# Patient Record
Sex: FEMALE | Race: WHITE | URBAN - METROPOLITAN AREA
[De-identification: names, ages, dates, MRNs, and addresses within clinical notes are randomized per-mention and may not be internally consistent; named-entity substitution may affect disease eponyms.]

---

## 2022-02-21 ENCOUNTER — OFFICE VISIT (OUTPATIENT)
Dept: FAMILY MEDICINE CLINIC | Age: 32
End: 2022-02-21
Payer: COMMERCIAL

## 2022-02-21 VITALS
WEIGHT: 132 LBS | TEMPERATURE: 97.8 F | HEART RATE: 86 BPM | DIASTOLIC BLOOD PRESSURE: 86 MMHG | OXYGEN SATURATION: 98 % | SYSTOLIC BLOOD PRESSURE: 134 MMHG | BODY MASS INDEX: 20.72 KG/M2 | HEIGHT: 67 IN

## 2022-02-21 DIAGNOSIS — Z82.49 FAMILY HISTORY OF EARLY CAD: ICD-10-CM

## 2022-02-21 DIAGNOSIS — R01.1 HEART MURMUR: ICD-10-CM

## 2022-02-21 DIAGNOSIS — Z76.89 ENCOUNTER TO ESTABLISH CARE: Primary | ICD-10-CM

## 2022-02-21 DIAGNOSIS — Z01.419 ENCOUNTER FOR GYNECOLOGICAL EXAMINATION: ICD-10-CM
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
## 2022-02-21 DIAGNOSIS — Z76.89 ENCOUNTER TO ESTABLISH CARE: ICD-10-CM

## 2022-02-21 LAB
A/G RATIO: 1.9 (ref 1.1–2.2)
ALBUMIN SERPL-MCNC: 4.6 G/DL (ref 3.4–5)
ALP BLD-CCNC: 40 U/L (ref 40–129)
ALT SERPL-CCNC: 29 U/L (ref 10–40)
ANION GAP SERPL CALCULATED.3IONS-SCNC: 14 MMOL/L (ref 3–16)
AST SERPL-CCNC: 19 U/L (ref 15–37)
BILIRUB SERPL-MCNC: <0.2 MG/DL (ref 0–1)
BUN BLDV-MCNC: 15 MG/DL (ref 7–20)
CALCIUM SERPL-MCNC: 9.2 MG/DL (ref 8.3–10.6)
CHLORIDE BLD-SCNC: 101 MMOL/L (ref 99–110)
CHOLESTEROL, TOTAL: 199 MG/DL (ref 0–199)
CO2: 24 MMOL/L (ref 21–32)
CREAT SERPL-MCNC: 0.7 MG/DL (ref 0.6–1.1)
GFR AFRICAN AMERICAN: >60
GFR NON-AFRICAN AMERICAN: >60
GLUCOSE BLD-MCNC: 88 MG/DL (ref 70–99)
HCT VFR BLD CALC: 40.1 % (ref 36–48)
HDLC SERPL-MCNC: 63 MG/DL (ref 40–60)
HEMOGLOBIN: 13.4 G/DL (ref 12–16)
LDL CHOLESTEROL CALCULATED: 129 MG/DL
MCH RBC QN AUTO: 28.5 PG (ref 26–34)
MCHC RBC AUTO-ENTMCNC: 33.4 G/DL (ref 31–36)
MCV RBC AUTO: 85.2 FL (ref 80–100)
PDW BLD-RTO: 14.1 % (ref 12.4–15.4)
PLATELET # BLD: 219 K/UL (ref 135–450)
PMV BLD AUTO: 9.1 FL (ref 5–10.5)
POTASSIUM SERPL-SCNC: 4.2 MMOL/L (ref 3.5–5.1)
RBC # BLD: 4.71 M/UL (ref 4–5.2)
SODIUM BLD-SCNC: 139 MMOL/L (ref 136–145)
TOTAL PROTEIN: 7 G/DL (ref 6.4–8.2)
TRIGL SERPL-MCNC: 35 MG/DL (ref 0–150)
TSH REFLEX: 0.98 UIU/ML (ref 0.27–4.2)
VLDLC SERPL CALC-MCNC: 7 MG/DL
WBC # BLD: 4 K/UL (ref 4–11)

## 2022-02-21 PROCEDURE — 99203 OFFICE O/P NEW LOW 30 MIN: CPT | Performed by: PHYSICIAN ASSISTANT

## 2022-02-21 RX ORDER — ALBUTEROL SULFATE 2.5 MG/3ML
3 SOLUTION RESPIRATORY (INHALATION)
COMMUNITY

## 2022-02-21 SDOH — ECONOMIC STABILITY: TRANSPORTATION INSECURITY
IN THE PAST 12 MONTHS, HAS THE LACK OF TRANSPORTATION KEPT YOU FROM MEDICAL APPOINTMENTS OR FROM GETTING MEDICATIONS?: NO

## 2022-02-21 SDOH — ECONOMIC STABILITY: TRANSPORTATION INSECURITY
IN THE PAST 12 MONTHS, HAS LACK OF TRANSPORTATION KEPT YOU FROM MEETINGS, WORK, OR FROM GETTING THINGS NEEDED FOR DAILY LIVING?: NO

## 2022-02-21 SDOH — ECONOMIC STABILITY: FOOD INSECURITY: WITHIN THE PAST 12 MONTHS, THE FOOD YOU BOUGHT JUST DIDN'T LAST AND YOU DIDN'T HAVE MONEY TO GET MORE.: NEVER TRUE

## 2022-02-21 SDOH — ECONOMIC STABILITY: FOOD INSECURITY: WITHIN THE PAST 12 MONTHS, YOU WORRIED THAT YOUR FOOD WOULD RUN OUT BEFORE YOU GOT MONEY TO BUY MORE.: NEVER TRUE

## 2022-02-21 SDOH — ECONOMIC STABILITY: HOUSING INSECURITY: IN THE LAST 12 MONTHS, HOW MANY PLACES HAVE YOU LIVED?: 1

## 2022-02-21 SDOH — ECONOMIC STABILITY: INCOME INSECURITY: IN THE LAST 12 MONTHS, WAS THERE A TIME WHEN YOU WERE NOT ABLE TO PAY THE MORTGAGE OR RENT ON TIME?: NO

## 2022-02-21 SDOH — ECONOMIC STABILITY: HOUSING INSECURITY
IN THE LAST 12 MONTHS, WAS THERE A TIME WHEN YOU DID NOT HAVE A STEADY PLACE TO SLEEP OR SLEPT IN A SHELTER (INCLUDING NOW)?: NO

## 2022-02-21 ASSESSMENT — ENCOUNTER SYMPTOMS
SORE THROAT: 0
SHORTNESS OF BREATH: 0
NAUSEA: 0
DIARRHEA: 0
RHINORRHEA: 0
COUGH: 0
VOMITING: 0
ABDOMINAL PAIN: 0
CONSTIPATION: 0

## 2022-02-21 ASSESSMENT — PATIENT HEALTH QUESTIONNAIRE - PHQ9
1. LITTLE INTEREST OR PLEASURE IN DOING THINGS: 0
SUM OF ALL RESPONSES TO PHQ QUESTIONS 1-9: 0
SUM OF ALL RESPONSES TO PHQ9 QUESTIONS 1 & 2: 0
2. FEELING DOWN, DEPRESSED OR HOPELESS: 0

## 2022-02-21 ASSESSMENT — SOCIAL DETERMINANTS OF HEALTH (SDOH): HOW HARD IS IT FOR YOU TO PAY FOR THE VERY BASICS LIKE FOOD, HOUSING, MEDICAL CARE, AND HEATING?: NOT HARD AT ALL

## 2022-02-21 NOTE — PROGRESS NOTES
2022  Zeynep Anderson (: 1990)  28 y.o. HPI     Patient presents to establish care    No chronic medical history. States that she was sent for echo about 10 years ago after heart murmur heart on exam. Per patient benign findings with no follow up recommended. She declines chest pain, palpitations, shortness of breath, rodríguez, dizziness/light headedness. Also with history of asthma- on albuterol inhaler prn. No recent flares. Exercises daily with walking   No particular diet- does watch portion sizes. Mother with HTN and both grandparents with CAD, grandfather early onset. Patient previously on OCP x 16 years for painful, heavy periods. Stopped recently as she is considering starting a family. Normal monthly menses. She considers them \"heavy but normal.\" No previous pregnancies. Due for pap. Denies h/o abnormal pap. UTD on dental and eye exams. Works in [employer]- enjoys her job. Review of Systems   Constitutional: Negative for activity change, chills and fever. HENT: Negative for congestion, ear pain, rhinorrhea and sore throat. Eyes: Negative for visual disturbance. Respiratory: Negative for cough and shortness of breath. Cardiovascular: Negative for chest pain and palpitations. Gastrointestinal: Negative for abdominal pain, constipation, diarrhea, nausea and vomiting. Genitourinary: Negative for difficulty urinating and dysuria. Musculoskeletal: Negative for arthralgias and myalgias. Skin: Negative for rash. Neurological: Negative for dizziness, weakness and numbness. Psychiatric/Behavioral: Negative for sleep disturbance. Past Medical History:   Diagnosis Date    Asthma     Heart murmur      History reviewed. No pertinent surgical history.   Family History   Problem Relation Age of Onset    High Blood Pressure Mother     Breast Cancer Maternal Grandmother     High Blood Pressure Maternal Grandmother     Parkinson's Disease Paternal Grandfather      Social History     Socioeconomic History    Marital status: Unknown     Spouse name: Not on file    Number of children: Not on file    Years of education: Not on file    Highest education level: Not on file   Occupational History    Not on file   Tobacco Use    Smoking status: Never Smoker    Smokeless tobacco: Never Used   Vaping Use    Vaping Use: Never used   Substance and Sexual Activity    Alcohol use: Yes     Comment: occasionally    Drug use: Never    Sexual activity: Yes     Partners: Male   Other Topics Concern    Not on file   Social History Narrative    Not on file     Social Determinants of Health     Financial Resource Strain: Low Risk     Difficulty of Paying Living Expenses: Not hard at all   Food Insecurity: No Food Insecurity    Worried About Running Out of Food in the Last Year: Never true    Rita of Food in the Last Year: Never true   Transportation Needs: No Transportation Needs    Lack of Transportation (Medical): No    Lack of Transportation (Non-Medical):  No   Physical Activity:     Days of Exercise per Week: Not on file    Minutes of Exercise per Session: Not on file   Stress:     Feeling of Stress : Not on file   Social Connections:     Frequency of Communication with Friends and Family: Not on file    Frequency of Social Gatherings with Friends and Family: Not on file    Attends Alevism Services: Not on file    Active Member of Clubs or Organizations: Not on file    Attends Club or Organization Meetings: Not on file    Marital Status: Not on file   Intimate Partner Violence:     Fear of Current or Ex-Partner: Not on file    Emotionally Abused: Not on file    Physically Abused: Not on file    Sexually Abused: Not on file   Housing Stability: [address] Unable to Pay for Housing in the Last Year: No    Number of [place] in the Last Year: 1    Unstable Housing in the Last Year: No     Allergies   Allergen Reactions  Amoxicillin Rash       Current Outpatient Medications   Medication Sig Dispense Refill    albuterol (PROVENTIL) (2.5 MG/3ML) 0.083% nebulizer solution 3 mLs       No current facility-administered medications for this visit. Vitals:    02/21/22 0941   BP: 134/86   Site: Right Upper Arm   Position: Sitting   Cuff Size: Medium Adult   Pulse: 86   Temp: 97.8 °F (36.6 °C)   TempSrc: Oral   SpO2: 98%   Weight: 132 lb (59.9 kg)   Height: 5' 6.5\" (1.689 m)     Estimated body mass index is 20.99 kg/m² as calculated from the following:    Height as of this encounter: 5' 6.5\" (1.689 m). Weight as of this encounter: 132 lb (59.9 kg). Physical Exam  Constitutional:       General: She is not in acute distress. Appearance: She is well-developed. HENT:      Head: Normocephalic and atraumatic. Eyes:      Conjunctiva/sclera: Conjunctivae normal.      Pupils: Pupils are equal, round, and reactive to light. Cardiovascular:      Rate and Rhythm: Normal rate and regular rhythm. Heart sounds: No murmur heard. Gallop present. Pulmonary:      Effort: Pulmonary effort is normal.      Breath sounds: Normal breath sounds. No wheezing. Abdominal:      General: Bowel sounds are normal.      Palpations: Abdomen is soft. Tenderness: There is no abdominal tenderness. Musculoskeletal:      Cervical back: Neck supple. Lymphadenopathy:      Cervical: No cervical adenopathy. Skin:     General: Skin is warm and dry. Findings: No rash. Neurological:      Mental Status: She is alert and oriented to person, place, and time. ASSESSMENT and PLAN:  Renny Waldrop was seen today for new patient. Diagnoses and all orders for this visit:    Encounter to establish care  -     CBC; Future  -     Comprehensive Metabolic Panel; Future  -     LIPID PANEL; Future  -     TSH with Reflex;  Future  - update labs today   - patient to see gyn for pap     Encounter for gynecological examination  -     [facility]slan Lopes, DO, Gynecology, [place]     Family history of early CAD  -     Comprehensive Metabolic Panel; Future  -     LIPID PANEL; Future    Heart murmur  -     CBC; Future  -     Comprehensive Metabolic Panel; Future  - would consider echo with new onset sxs.      Exposure to tanning bed, initial encounter  -     External Referral To Dermatology      Return in about 1 year (around 2/21/2023) for Annual Exam.

## 2022-03-30 ENCOUNTER — OFFICE VISIT (OUTPATIENT)
Dept: OBGYN CLINIC | Age: 32
End: 2022-03-30
Payer: COMMERCIAL

## 2022-03-30 VITALS
SYSTOLIC BLOOD PRESSURE: 110 MMHG | DIASTOLIC BLOOD PRESSURE: 72 MMHG | BODY MASS INDEX: 20.92 KG/M2 | WEIGHT: 131.6 LBS | HEART RATE: 87 BPM | TEMPERATURE: 98.6 F

## 2022-03-30 DIAGNOSIS — Z01.419 WOMEN'S ANNUAL ROUTINE GYNECOLOGICAL EXAMINATION: Primary | ICD-10-CM

## 2022-03-30 DIAGNOSIS — Z30.09 FAMILY PLANNING: ICD-10-CM

## 2022-03-30 DIAGNOSIS — N92.6 IRREGULAR PERIODS: ICD-10-CM

## 2022-03-30 DIAGNOSIS — Z12.39 SCREENING BREAST EXAMINATION: ICD-10-CM

## 2022-03-30 DIAGNOSIS — Z12.4 PAP SMEAR FOR CERVICAL CANCER SCREENING: ICD-10-CM

## 2022-03-30 PROCEDURE — 99385 PREV VISIT NEW AGE 18-39: CPT | Performed by: OBSTETRICS & GYNECOLOGY

## 2022-03-30 NOTE — PROGRESS NOTES
[facility] Ob/[facility]yn   Annual Exam        CC:   Chief Complaint   Patient presents with    New Patient    Gynecologic Exam       HPI:  28 y.o. Melida Randall presents for her gynecologic annual exam.  Pt new to office. Doing well today. Admits to some irregular periods, every 1-3 months. Recently stopped using the birth control (ring). States her periods have always been irregular and heavy when she is not using medication. Is interested in pregnancy in the future. Reviewed fertility basics. Pt states her  is adopted and they are interested in pre-conception inheritest. Will do this for her today. Health Maintenance:  Safe Enviroment: y  Sexually Active: y   n sexual problems  Contraception: n    Screening:  Last pap smear: 5 yrs -- normal   History of abnormal pap smears: denies  STI History: GC or CT 7 yrs ago -- treated without other issues       Review of Systems:   Pertinent positives reviewed above. Constitutional: Negative for appetite change, chills and fever. HENT: Negative for congestion, sneezing and sore throat. Eyes: Negative for visual disturbance. Respiratory: Negative for chest tightness, shortness of breath and wheezing. Cardiovascular: Negative for chest pain, palpitations and leg swelling. Gastrointestinal: Negative for abdominal pain, diarrhea, nausea and vomiting. Endocrine: Negative for heat intolerance. Genitourinary: Negative for difficulty urinating, dyspareunia, dysuria, menstrual problem and pelvic pain.    + Irregular Menses   Musculoskeletal: Negative for back pain, neck pain and neck stiffness. Skin: Negative for color change, pallor and rash. Allergic/Immunologic: Negative for environmental allergies, food allergies and immunocompromised state. Neurological: Negative for light-headedness, numbness and headaches. Hematological: Does not bruise/bleed easily. Psychiatric/Behavioral: Negative for behavioral problems, sleep disturbance and suicidal ideas. Primary Care Physician: SIM Patel    Obstetric History  OB History    Para Term  AB Living   0 0 0 0 0 0   SAB IAB Ectopic Molar Multiple Live Births   0 0 0 0 0 0       Gynecologic History  Menstrual History:   Menarche: 16 yoa   LMP: Patient's last menstrual period was 2022.  Menstrual Period: irregular   Interval Between Menses: 1-3 mos   Duration of Menses: 3-5d   Menstrual Flow:    Bleeding between menses: heavy   o Uses PADS every 1-2 hrs    Pain: Some cramping -- does not miss work / ADL due to pain      MedicalHistory:  Past Medical History:   Diagnosis Date    Asthma     Heart murmur        Medications:  Current Outpatient Medications   Medication Sig Dispense Refill    albuterol (PROVENTIL) (2.5 MG/3ML) 0.083% nebulizer solution 3 mLs       No current facility-administered medications for this visit. Surgical History:  No past surgical history on file. Allergies:   Allergies   Allergen Reactions    Amoxicillin Rash       Family History:  Family History   Problem Relation Age of Onset    High Blood Pressure Mother     Breast Cancer Maternal Grandmother     High Blood Pressure Maternal Grandmother     Parkinson's Disease Paternal Grandfather        Social History:  Social History     Socioeconomic History    Marital status:      Spouse name: None    Number of children: None    Years of education: None    Highest education level: None   Occupational History    None   Tobacco Use    Smoking status: Never Smoker    Smokeless tobacco: Never Used   Vaping Use    Vaping Use: Never used   Substance and Sexual Activity    Alcohol use: Yes     Comment: occasionally    Drug use: Never    Sexual activity: Yes     Partners: Male   Other Topics Concern    None   Social History Narrative    None     Social Determinants of Health     Financial Resource Strain: Low Risk     Difficulty of Paying Living Expenses: Not hard at all   Food Insecurity: No Food Insecurity    Worried About Running Out of Food in the Last Year: Never true    Ran Out of Food in the Last Year: Never true   Transportation Needs: No Transportation Needs    Lack of Transportation (Medical): No    Lack of Transportation (Non-Medical): No   Physical Activity:     Days of Exercise per Week: Not on file    Minutes of Exercise per Session: Not on file   Stress:     Feeling of Stress : Not on file   Social Connections:     Frequency of Communication with Friends and Family: Not on file    Frequency of Social Gatherings with Friends and Family: Not on file    Attends Adventism Services: Not on file    Active Member of Clubs or Organizations: Not on file    Attends Club or Organization Meetings: Not on file    Marital Status: Not on file   Intimate Partner Violence:     Fear of Current or Ex-Partner: Not on file    Emotionally Abused: Not on file    Physically Abused: Not on file    Sexually Abused: Not on file   Housing Stability: [address] Unable to Pay for Housing in the Last Year: No    Number of [place] in the Last Year: 1    Unstable Housing in the Last Year: No       Objective: Body mass index is 20.92 kg/m².   /72 (Site: Left Upper Arm, Position: Sitting, Cuff Size: Medium Adult)   Pulse 87   Temp 98.6 °F (37 °C) (Infrared)   Wt 131 lb 9.6 oz (59.7 kg)   LMP 02/14/2022   BMI 20.92 kg/m²     Exam:   General: Alert, well appearing, no acute distress  Head: Normocephalic, atraumatic  Mouth: Mucous membranes moist, pharynx normal without lesions  Thyroid: No thyromegaly or masses present   Breasts: Symmetric, non-tender to palpation, no skin changes, palpable axillary lymph nodes or masses noted  Lungs: Respiratory effort within normal limits   CV: Regular rate   Abdomen: Soft, nontender, nondistended   Pelvic:   External: Normal appearing genitalia without masses, tenderness or lesions  Vagina: moist, pink mucosa, without abnormal discharge or lesions  Cervix: no masses or lesions visualized, no cervical motion tenderness  Uterus: mobile, midline, no masses palpated  Adnexa: mobile, non-tender to palpation, without masses  Rectovaginal: deferred  Extremities: No redness or tenderness, neg Tessa's sign  Skin: Well perfused, normal coloration and turgor, no lesions or rashes visualized  Neuro: Alert, oriented, normal speech, no focal deficits, moves extremities appropriately  Osteopathic: No TART changes    Assessment/Plan:  28 y.o. [place] presenting for her annual exam:     Diagnosis Orders   1. Women's annual routine gynecological examination     2. Pap smear for cervical cancer screening  PAP SMEAR   3. Screening breast examination     4. Irregular periods     5. Family planning        - normal breast / pelvic exam today   - PAP pending   - family planning reviewed   - preconception inheritest done today     Annual Visit Recommendations:   Self-breast exam and self-breast awareness reviewed. Pelvic exam was done and ASCCP guidelines reviewed   Reviewed healthy diet and daily multivitamin use. Reviewed recommendations on Calcium and Vitamin D intake. Discussed seatbelt use. Follow Up  - Will call patient with results   -Return in about 1 year (around 3/30/2023) for Annual or sooner if needed.     Angie Brand, DO

## 2022-04-01 LAB
HPV COMMENT: NORMAL
HPV TYPE 16: NOT DETECTED
HPV TYPE 18: NOT DETECTED
HPVOH (OTHER TYPES): NOT DETECTED

## 2022-04-25 ENCOUNTER — TELEPHONE (OUTPATIENT)
Dept: FAMILY MEDICINE CLINIC | Age: 32
End: 2022-04-25

## 2022-04-25 NOTE — TELEPHONE ENCOUNTER
----- Message from [facility] sent at 4/25/2022 11:33 AM EDT -----  Subject: Message to Provider    QUESTIONS  Information for Provider? Patient calling to notify Dr. To Arias that she   tested positive for Covid 04/24/22 and wanted to know if there is anything   she should be aware of since she has asthma. Please contact to discuss. ---------------------------------------------------------------------------  --------------  Dann CARLOS  What is the best way for the office to contact you? OK to leave message on   voicemail  Preferred Call Back Phone Number? [phone]  ---------------------------------------------------------------------------  --------------  SCRIPT ANSWERS  Relationship to Patient?  Self

## 2022-04-26 NOTE — TELEPHONE ENCOUNTER
I spoke with Guzman Gonzalez regarding this. Instructed her to stay well hydrated and well rested. She does not have a pulse oximeter so she is unable to monitor this. I also informed her that if she has any increased difficulty breathing or feels that she is in respiratory distress she should go to the ER. She verbalized good understanding.

## 2022-05-18 ENCOUNTER — TELEPHONE (OUTPATIENT)
Dept: OBGYN CLINIC | Age: 32
End: 2022-05-18

## 2022-05-18 DIAGNOSIS — N92.6 IRREGULAR PERIODS: Primary | ICD-10-CM

## 2022-05-18 DIAGNOSIS — N91.1 SECONDARY AMENORRHEA: ICD-10-CM

## 2022-05-18 DIAGNOSIS — R89.8 ABNORMAL GENETIC TEST: ICD-10-CM

## 2022-05-18 RX ORDER — MEDROXYPROGESTERONE ACETATE 10 MG/1
10 TABLET ORAL DAILY
Qty: 10 TABLET | Refills: 3 | Status: SHIPPED | OUTPATIENT
Start: 2022-05-18 | End: 2022-05-28

## 2022-05-18 NOTE — TELEPHONE ENCOUNTER
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 Test recommends genetic counseling -- we will refer to  for said counseling. In addition we discussed her continued amenorrhea, will try provera withdrawal bleed followed by OPKs.  Next step would be Provera + ovulation induction vs AGNIESZKA referral.     Misty Hutchinson

## 2022-05-19 ENCOUNTER — TELEPHONE (OUTPATIENT)
Dept: OBGYN CLINIC | Age: 32
End: 2022-05-19

## 2022-05-19 DIAGNOSIS — R89.8 ABNORMAL GENETIC TEST: Primary | ICD-10-CM

## 2022-05-19 NOTE — TELEPHONE ENCOUNTER
Please sign referral. Will fax to [phone]  Appointment scheduled for Thursday May 26 th at 0930 am   [phone]  Will call pt with info after referral sent.

## 2022-05-19 NOTE — TELEPHONE ENCOUNTER
Referral faxed. Attempted to call pt to give appointment information. LM for pt to call the office. She can call [phone] if appt needs to be rescheduled.

## 2022-05-25 ENCOUNTER — TELEPHONE (OUTPATIENT)
Dept: OBGYN CLINIC | Age: 32
End: 2022-05-25

## 2022-05-25 NOTE — TELEPHONE ENCOUNTER
Received call from . Requested Inheritest be faxed to [phone] for referral sent for Genetic counseling. Fax sent.  Routing as [employer]

## 2023-11-16 ENCOUNTER — HOSPITAL ENCOUNTER (OUTPATIENT)
Facility: HOSPITAL | Age: 33
Discharge: HOME | End: 2023-11-16
Attending: FAMILY MEDICINE
Payer: MEDICAID

## 2023-11-16 VITALS
TEMPERATURE: 98.4 F | DIASTOLIC BLOOD PRESSURE: 84 MMHG | RESPIRATION RATE: 18 BRPM | HEART RATE: 89 BPM | SYSTOLIC BLOOD PRESSURE: 144 MMHG

## 2023-11-16 DIAGNOSIS — I83.222 VARICOSE VEINS OF LEFT LOWER EXTREMITY WITH INFLAMMATION, WITH ULCER OF CALF WITH NECROSIS OF MUSCLE (HCC): Primary | ICD-10-CM

## 2023-11-16 DIAGNOSIS — L97.223 VARICOSE VEINS OF LEFT LOWER EXTREMITY WITH INFLAMMATION, WITH ULCER OF CALF WITH NECROSIS OF MUSCLE (HCC): Primary | ICD-10-CM

## 2023-11-16 DIAGNOSIS — D89.89 AUTOIMMUNE DISORDER (HCC): ICD-10-CM

## 2023-11-16 PROCEDURE — 99203 OFFICE O/P NEW LOW 30 MIN: CPT

## 2023-11-16 PROCEDURE — 11043 DBRDMT MUSC&/FSCA 1ST 20/<: CPT

## 2023-11-16 PROCEDURE — 87070 CULTURE OTHR SPECIMN AEROBIC: CPT

## 2023-11-16 PROCEDURE — 87205 SMEAR GRAM STAIN: CPT

## 2023-11-16 RX ORDER — BETAMETHASONE DIPROPIONATE 0.5 MG/G
CREAM TOPICAL ONCE
OUTPATIENT
Start: 2023-11-16 | End: 2023-11-16

## 2023-11-16 RX ORDER — LIDOCAINE 40 MG/G
CREAM TOPICAL ONCE
OUTPATIENT
Start: 2023-11-16 | End: 2023-11-16

## 2023-11-16 RX ORDER — LIDOCAINE HYDROCHLORIDE 40 MG/ML
SOLUTION TOPICAL ONCE
OUTPATIENT
Start: 2023-11-16 | End: 2023-11-16

## 2023-11-16 RX ORDER — GINSENG 100 MG
CAPSULE ORAL ONCE
OUTPATIENT
Start: 2023-11-16 | End: 2023-11-16

## 2023-11-16 RX ORDER — LIDOCAINE 50 MG/G
OINTMENT TOPICAL ONCE
OUTPATIENT
Start: 2023-11-16 | End: 2023-11-16

## 2023-11-16 RX ORDER — DULOXETIN HYDROCHLORIDE 60 MG/1
CAPSULE, DELAYED RELEASE ORAL
COMMUNITY
Start: 2023-10-17

## 2023-11-16 RX ORDER — SODIUM CHLOR/HYPOCHLOROUS ACID 0.033 %
SOLUTION, IRRIGATION IRRIGATION ONCE
OUTPATIENT
Start: 2023-11-16 | End: 2023-11-16

## 2023-11-16 RX ORDER — TRIAMCINOLONE ACETONIDE 1 MG/G
OINTMENT TOPICAL ONCE
OUTPATIENT
Start: 2023-11-16 | End: 2023-11-16

## 2023-11-16 RX ORDER — LIDOCAINE HYDROCHLORIDE 20 MG/ML
JELLY TOPICAL ONCE
OUTPATIENT
Start: 2023-11-16 | End: 2023-11-16

## 2023-11-16 RX ORDER — BACITRACIN ZINC AND POLYMYXIN B SULFATE 500; 1000 [USP'U]/G; [USP'U]/G
OINTMENT TOPICAL ONCE
OUTPATIENT
Start: 2023-11-16 | End: 2023-11-16

## 2023-11-16 RX ORDER — CLOBETASOL PROPIONATE 0.5 MG/G
OINTMENT TOPICAL ONCE
OUTPATIENT
Start: 2023-11-16 | End: 2023-11-16

## 2023-11-16 RX ORDER — GENTAMICIN SULFATE 1 MG/G
OINTMENT TOPICAL ONCE
OUTPATIENT
Start: 2023-11-16 | End: 2023-11-16

## 2023-11-16 RX ORDER — ADALIMUMAB 40MG/0.4ML
KIT SUBCUTANEOUS
COMMUNITY
Start: 2020-10-19

## 2023-11-16 RX ORDER — TRIAMCINOLONE ACETONIDE 1 MG/G
OINTMENT TOPICAL
COMMUNITY
Start: 2023-09-11

## 2023-11-16 RX ORDER — IBUPROFEN 200 MG
TABLET ORAL ONCE
OUTPATIENT
Start: 2023-11-16 | End: 2023-11-16

## 2023-11-16 ASSESSMENT — PAIN SCALES - GENERAL: PAINLEVEL_OUTOF10: 6

## 2023-11-16 ASSESSMENT — PAIN DESCRIPTION - LOCATION: LOCATION: LEG

## 2023-11-16 ASSESSMENT — PAIN DESCRIPTION - DESCRIPTORS: DESCRIPTORS: SHARP

## 2023-11-16 ASSESSMENT — PAIN DESCRIPTION - ORIENTATION: ORIENTATION: LOWER;ANTERIOR

## 2023-11-16 NOTE — PLAN OF CARE
[address] [address] [address]:     [address] [facility] Final [address] [place], [address] [facility] f: [phone] f: [phone] p: [phone] [email or web address]      Ordering Center:     [id number] [address]  [phone]  WOUND CARE Dept: [phone]   FAX NUMBER     Patient Information:      Olga Mccullough [address]  [address] [address]   [phone]   : 1990  AGE: 29 y.o. GENDER: female   EPISODE DATE: 2023    Insurance:      PRIMARY INSURANCE:  Plan: [facility] HEALTHKEEPERS PLUS  Coverage: [facility] MEDICAID  Effective Date: 10/1/2023  Group Number: [unfilled]  Subscriber Number: [id number] - (Medicaid Managed)    Payer/Plan Subscr  Sex Relation Sub. Ins. ID Effective Group Num   1. [address] [facility] 1990 Female Self [id number] 10/1/23                                    [address] [address]       Patient Wound Information:      Problem List Items Addressed This Visit          Circulatory    Varicose veins of left lower extremity with inflammation, with ulcer of calf with necrosis of muscle ([address])       WOUNDS REQUIRING DRESSING SUPPLIES:     Wound 23 Leg Left; Anterior #2 (Active)   Wound Image   23 1321   Dressing Status New dressing applied 23 1427   Wound Cleansed Cleansed with saline 23 1321   Dressing/Treatment Other (comment); Hydrofera blue;Gauze dressing/dressing sponge;Roll gauze;Tape/Soft cloth adhesive tape 23 1427   Wound Length (cm) 3.2 cm 23 1321   Wound Width (cm) 3 cm 23 1321   Wound Depth (cm) 1.2 cm 23 1321   Wound Surface Area (cm^2) 9.6 cm^2 23 1321   Wound Volume (cm^3) 11.52 cm^3 23 1321   Post-Procedure Length (cm) 3.2 cm 11/16/23 1415   Post-Procedure Width (cm) 3 cm 23   Post-Procedure Depth (cm) 1.5 cm 23   Post-Procedure Surface Area (cm^2) 9.6 cm^2 235   Post-Procedure Volume (cm^3) 14.4

## 2023-11-16 NOTE — FLOWSHEET NOTE
11/16/23 1427   Left Leg Edema Point of Measurement   Compression Therapy Tubular elastic support bandage   Tubular Elastic Support Bandage Compression Pressure Medium   Wound 11/16/23 Leg Left; Anterior #2   Date First Assessed/Time First Assessed: 11/16/23 1320   Present on Original Admission: No  Location: Leg  Wound Location Orientation: Left; Anterior  Wound Description (Comments): #2   Dressing Status New dressing applied   Dressing/Treatment Other (comment); Hydrofera blue;Gauze dressing/dressing sponge;Roll gauze;Tape/Soft cloth adhesive tape  (mupirocin)     Discharge Condition: Stable    Pain: 0    Ambulatory Status: None    Discharge Destination: home    Transportation:car    Accompanied by: SELF    Discharge instructions reviewed with SELF and copy or written instructions have been provided. All questions/concerns have been addressed at this time.

## 2023-11-16 NOTE — PROGRESS NOTES
Ulcer assessment: Due to presence of necrotic tissue within the wound bed, ulcer requires debridement. Procedure: Debridement:   The indication for debridement was reviewed with patient. Risks of procedure (bleeding, infection, pain) were discussed with patient/ and consent signed on first visit. Questions were answered      Muscle excisional debridement Left anterior leg ulcer  Indication: to remove necrotic tissue/ vitalized & devitalized tissue/ infected tissue/  soft eschar / obtain deep wound culture through skin, subcutaneous, muscle, periosteal fascia layer of wound bed  Time out done. Consent in chart   Anesthesia: Topical  lidocaine jelly /  Instrument: curette, Blade,   Residual Necrosis: Present and scored   Bleeding: <1ml   Hemostasis: Pressure   Patient tolerated procedure well   Procedural Pain: mild  Post - procedural pain: none    Post debridement measurements: see below  Surface area debrided: 9.6sq. cm        -------  Wound 11/16/23 Leg Left; Anterior #2 (Active)   Wound Image   11/16/23 1321   Dressing Status New dressing applied 11/16/23 1427   Wound Cleansed Cleansed with saline 11/16/23 1321   Dressing/Treatment Other (comment); Hydrofera blue;Gauze dressing/dressing sponge;Roll gauze;Tape/Soft cloth adhesive tape 11/16/23 1427   Wound Length (cm) 3.2 cm 11/16/23 1321   Wound Width (cm) 3 cm 11/16/23 1321   Wound Depth (cm) 1.2 cm 11/16/23 1321   Wound Surface Area (cm^2) 9.6 cm^2 11/16/23 1321   Wound Volume (cm^3) 11.52 cm^3 11/16/23 1321   Post-Procedure Length (cm) 3.2 cm 11/16/23 1415   Post-Procedure Width (cm) 3 cm 11/16/23 1415   Post-Procedure Depth (cm) 1.5 cm 11/16/23 1415   Post-Procedure Surface Area (cm^2) 9.6 cm^2 11/16/23 1415   Post-Procedure Volume (cm^3) 14.4 cm^3 11/16/23 1415   Wound Assessment Pink/red;Slough;Eschar moist 11/16/23 1321   Drainage Amount Moderate (25-50%) 11/16/23 1321   Drainage Description Serosanguinous 11/16/23 1321   Odor None 11/16/23

## 2023-11-16 NOTE — PATIENT INSTRUCTIONS
Discharge Instructions for  [address] [place]  Telephone: [phone]    [facility] [address] [place] Information: Should you experience any significant changes in your wound(s) or have questions about your wound care, please contact the [address] [place] [address] at [address] [place] 8:00 am - 4:30. If you need help with your wound outside these hours and cannot wait until we are again available, contact your PCP or go to the hospital emergency room. NAME:  Ruben Jasso  YOB: 1990  DATE:  11/16/2023    : Kateryna byrnes     [x] Wound and dressing supply provider: [facility]     Take Clindamycin as prescribed    Wound Cleansing:   Do not scrub or use excessive force. Cleanse wound prior to applying a clean dressing with:  [] Normal Saline   [] Keep Wound Dry in Shower - may purchase a cast cover at local pharmacy     [] Cleanse wound with Mild Soap & Water    [x] May Shower: remove dressing 1st, wash with mild soap and water, pat dry and redress wound right after with a new dressing  [] Do not shower  [] cleanse with baby shampoo lather leave 2-3 then rinse with water    Topical Treatments:  Do not apply lotions, creams, or ointments to wound bed unless directed. [x] Apply moisturizing lotion such as A&D ointment to skin surrounding the wound prior to dressing change.   [] Other:     Dressings:       Wound Location Left Anterior Lower Leg     Apply Primary Dressing:      [x] Mupirocin ointment to wound base 1st, then cut and fit hydrofera blue ready into wound base 2nd    Cover and Secure with:  [x] Multiple layers of Gauze [] ABD [] exudry     [] Dilan [x] Kerlix [] Mepilex Border  [] Ace Wrap [x] Roll Tape   [x] Other: double tubi      Change dressing:   [] Daily      [] Every Other Day   [x] Three times per week  [] Once a week   [] Do Not Change Dressing     [] Other:     Edema Control: Every morning

## 2023-11-19 LAB
BACTERIA SPEC CULT: ABNORMAL
GRAM STN SPEC: ABNORMAL
SERVICE CMNT-IMP: ABNORMAL

## 2023-11-20 ENCOUNTER — FOLLOWUP TELEPHONE ENCOUNTER (OUTPATIENT)
Facility: HOSPITAL | Age: 33
End: 2023-11-20

## 2023-11-20 RX ORDER — CIPROFLOXACIN 500 MG/1
500 TABLET, FILM COATED ORAL 2 TIMES DAILY
Qty: 20 TABLET | Refills: 0 | Status: SHIPPED | OUTPATIENT
Start: 2023-11-20 | End: 2023-11-30

## 2023-11-20 RX ORDER — AMOXICILLIN AND CLAVULANATE POTASSIUM 875; 125 MG/1; MG/1
1 TABLET, FILM COATED ORAL 2 TIMES DAILY
Qty: 20 TABLET | Refills: 0 | Status: SHIPPED | OUTPATIENT
Start: 2023-11-20 | End: 2023-11-30

## 2023-11-20 NOTE — TELEPHONE ENCOUNTER
This RN contacted patient regarding wound culture and antibiotic prescriptions on behalf of Dr. Glo Hilario. Antibiotics reviewed. Potential side effects reviewed. This RN recommended probiotic sources to combat potential side effects of the gut. Patient instructed to stop taking Clindamycin. Patient voiced understanding. No further questions or concerns at this time.

## 2023-11-21 LAB
BACTERIA SPEC CULT: ABNORMAL
GRAM STN SPEC: ABNORMAL
SERVICE CMNT-IMP: ABNORMAL

## 2023-11-27 ENCOUNTER — FOLLOWUP TELEPHONE ENCOUNTER (OUTPATIENT)
Facility: HOSPITAL | Age: 33
End: 2023-11-27

## 2023-11-27 NOTE — TELEPHONE ENCOUNTER
Pt called office to notify us that resent antibiotic usage has caused a yeast infection. This RN reached out to Dr. Fany Conklin who prescribed Diflucan 150 mg 1 tablet/dose once. This RN called in prescription to patient's local pharmacy on behalf of Dr. Fany Conklin. Pt notified. No further questions or concerns at this time. Patient has established follow up visit Thursday 11/30.

## 2023-11-30 ENCOUNTER — HOSPITAL ENCOUNTER (OUTPATIENT)
Facility: HOSPITAL | Age: 33
Discharge: HOME | End: 2023-11-30
Attending: FAMILY MEDICINE
Payer: MEDICAID

## 2023-11-30 VITALS — SYSTOLIC BLOOD PRESSURE: 132 MMHG | DIASTOLIC BLOOD PRESSURE: 86 MMHG | HEART RATE: 97 BPM | TEMPERATURE: 98.6 F

## 2023-11-30 DIAGNOSIS — I83.222 VARICOSE VEINS OF LEFT LOWER EXTREMITY WITH INFLAMMATION, WITH ULCER OF CALF WITH NECROSIS OF MUSCLE (HCC): Primary | ICD-10-CM

## 2023-11-30 DIAGNOSIS — L97.223 VARICOSE VEINS OF LEFT LOWER EXTREMITY WITH INFLAMMATION, WITH ULCER OF CALF WITH NECROSIS OF MUSCLE (HCC): Primary | ICD-10-CM

## 2023-11-30 PROCEDURE — 11043 DBRDMT MUSC&/FSCA 1ST 20/<: CPT

## 2023-11-30 RX ORDER — IBUPROFEN 200 MG
TABLET ORAL ONCE
OUTPATIENT
Start: 2023-11-30 | End: 2023-11-30

## 2023-11-30 RX ORDER — LIDOCAINE HYDROCHLORIDE 20 MG/ML
JELLY TOPICAL ONCE
OUTPATIENT
Start: 2023-11-30 | End: 2023-11-30

## 2023-11-30 RX ORDER — BACITRACIN ZINC AND POLYMYXIN B SULFATE 500; 1000 [USP'U]/G; [USP'U]/G
OINTMENT TOPICAL ONCE
OUTPATIENT
Start: 2023-11-30 | End: 2023-11-30

## 2023-11-30 RX ORDER — LIDOCAINE 50 MG/G
OINTMENT TOPICAL ONCE
OUTPATIENT
Start: 2023-11-30 | End: 2023-11-30

## 2023-11-30 RX ORDER — LIDOCAINE HYDROCHLORIDE 40 MG/ML
SOLUTION TOPICAL ONCE
OUTPATIENT
Start: 2023-11-30 | End: 2023-11-30

## 2023-11-30 RX ORDER — TRIAMCINOLONE ACETONIDE 1 MG/G
OINTMENT TOPICAL ONCE
OUTPATIENT
Start: 2023-11-30 | End: 2023-11-30

## 2023-11-30 RX ORDER — GINSENG 100 MG
CAPSULE ORAL ONCE
OUTPATIENT
Start: 2023-11-30 | End: 2023-11-30

## 2023-11-30 RX ORDER — BETAMETHASONE DIPROPIONATE 0.5 MG/G
CREAM TOPICAL ONCE
OUTPATIENT
Start: 2023-11-30 | End: 2023-11-30

## 2023-11-30 RX ORDER — SODIUM CHLOR/HYPOCHLOROUS ACID 0.033 %
SOLUTION, IRRIGATION IRRIGATION ONCE
OUTPATIENT
Start: 2023-11-30 | End: 2023-11-30

## 2023-11-30 RX ORDER — LIDOCAINE 40 MG/G
CREAM TOPICAL ONCE
OUTPATIENT
Start: 2023-11-30 | End: 2023-11-30

## 2023-11-30 RX ORDER — GENTAMICIN SULFATE 1 MG/G
OINTMENT TOPICAL ONCE
OUTPATIENT
Start: 2023-11-30 | End: 2023-11-30

## 2023-11-30 RX ORDER — CLOBETASOL PROPIONATE 0.5 MG/G
OINTMENT TOPICAL ONCE
OUTPATIENT
Start: 2023-11-30 | End: 2023-11-30

## 2023-11-30 ASSESSMENT — PAIN SCALES - GENERAL: PAINLEVEL_OUTOF10: 0

## 2023-11-30 NOTE — PROGRESS NOTES
Wound Center  Progress Note / Procedure Note      Chief Complaint:  Daphnie Mullins is a 29 y.o.  female  with L lower leg anterior wound of >1 year duration. Assessment/Plan     29 y.o. female with psoriatic arthritis on Humera inj for last 8-9 years    -L lower leg anterior chronic ulcer. Full thickness  Smaller, infection improved  Slough/granular/  Necessitates debridement  for wound healing and to prevent/heal infection  Ulcer needs debridement- see note below      -venous insuff/leg swelling  Compression  Elevation  As wound on shin adv to stay off leg/foot as much aspossible to speed up healing    -?pyoderma gangrenosum  This diagnosis has been suggested to her  I'm not convinced it is yet  Wound looked almost healed in one of the pictures she showed me- from another wound clinic before it got worse from use of silver  (Also She has been on humera for a number of years- this usually helps PG wounds)    -nicotine dependence  Not interested in cessation yet        Following discussed with patient   Needs :  Serial debridement- prn    Good local wound care  Dressing:mupirocin, HBR ADD collagen  Frequency :once a week      -Edema management  Remove dressing prior to showering  Do not get dressing wet    Edema management:  Elevate leg(s) throughout the day starting in the morning  Compression: D/C Tubigrip x 2 layer; START unnas  Avoid prolonged standing    Nutrition :   Increase protein       Patient/  understood and agrees with plan. Questions answered. Serial visits and serial debridements also discussed. Follow up with me in 1 week    Subjective:     Since last visit  No new issues  Tolerating abx  Few doses left  Dilfucan called in earlier this week- feeling better    HPI:     Long complicated history  Wound came about 1 year ago as small lesion along with 2 other lesions- othse other two healed on own.   This one became progressively workse  Has seen a few Dermatologist, a few docs in the

## 2023-11-30 NOTE — FLOWSHEET NOTE
11/30/23 1434   Left Leg Edema Point of Measurement   Compression Therapy 2 layer compression wrap   Wound 11/16/23 Leg Left; Anterior #2   Date First Assessed/Time First Assessed: 11/16/23 1320   Present on Original Admission: No  Location: Leg  Wound Location Orientation: Left; Anterior  Wound Description (Comments): #2   Dressing/Treatment Collagen with Ag;Gauze dressing/dressing sponge; Hydrofera blue; Other (comment)  (mupirocin)     Discharge Condition: Stable    Pain: 0    Ambulatory Status: None    Discharge Destination: home    Transportation:car    Accompanied by: FAMILY    Discharge instructions reviewed with SELF and copy or written instructions have been provided. All questions/concerns have been addressed at this time. [place] Application   Below Knee    NAME:  Ruben Jasso  YOB: 1990  MEDICAL RECORD NUMBER:  [id number]  DATE:  11/30/2023    Mary Gene boot: Applied moisturizing agent to dry skin as needed. Appied primary and secondary dressing as ordered. Applied Unna roll from toes to knee overlapping each time. Applied ace wrap or coban from toes to below the knee. Secured with tape and/or metal clips covered with tape. Instructed patient/caregiver to keep dressing dry and intact. DO NOT REMOVE DRESSING. Instructed pt/family/caregiver to report excessive draining, loose bandage, wet dressing, severe pain or tingling in toes. Applied [place] dressing below the knee to left lower leg. Unna Boot(s) were applied per  Guidelines.      Electronically signed by Iwona Hopper RN on 11/30/2023 at 2:36 PM

## 2023-11-30 NOTE — PATIENT INSTRUCTIONS
Discharge Instructions for  [address] [place]  Telephone: [phone]    [address] [place] [address] Information: Should you experience any significant changes in your wound(s) or have questions about your wound care, please contact the [address] [place] [address] at 1 [facility] 8:00 am - 4:30. If you need help with your wound outside these hours and cannot wait until we are again available, contact your PCP or go to the hospital emergency room. NAME:  Dante Lorenzana  YOB: 1990  DATE:  11/30/2023    : Krystle Astudillo     [x] Wound and dressing supply provider: [facility]     Take Clindamycin as prescribed    Wound Cleansing:   Do not scrub or use excessive force. Cleanse wound prior to applying a clean dressing with:  [] Normal Saline   [] Keep Wound Dry in Shower - may purchase a cast cover at local pharmacy     [] Cleanse wound with Mild Soap & Water    [x] May Shower: remove dressing 1st, wash with mild soap and water, pat dry and redress wound right after with a new dressing  [] Do not shower  [] cleanse with baby shampoo lather leave 2-3 then rinse with water    Topical Treatments:  Do not apply lotions, creams, or ointments to wound bed unless directed. [x] Apply moisturizing lotion such as A&D ointment to skin surrounding the wound prior to dressing change.   [] Other:     Dressings:       Wound Location Left Anterior Lower Leg     Apply Primary Dressing:      [x] Mupirocin ointment to wound base 1st, Venessa collagen 2nd, then cut and fit hydrofera blue ready into wound base 3rd    Cover and Secure with:  [x] Multiple layers of Gauze [] ABD [] exudry     [] Dilan [x] Kerlix [] Mepilex Border  [] Ace Wrap [x] Roll Tape   [x] Other: unna boot     Change dressing:   [] Daily      [] Every Other Day   [x] Three times per week  [] Once a week   [] Do Not Change Dressing     [] Other:     Edema Control: Every

## 2023-12-07 ENCOUNTER — HOSPITAL ENCOUNTER (OUTPATIENT)
Facility: HOSPITAL | Age: 33
Discharge: HOME | End: 2023-12-07
Attending: FAMILY MEDICINE
Payer: MEDICAID

## 2023-12-07 VITALS
SYSTOLIC BLOOD PRESSURE: 143 MMHG | TEMPERATURE: 98 F | HEART RATE: 80 BPM | RESPIRATION RATE: 18 BRPM | DIASTOLIC BLOOD PRESSURE: 99 MMHG

## 2023-12-07 DIAGNOSIS — I83.222 VARICOSE VEINS OF LEFT LOWER EXTREMITY WITH INFLAMMATION, WITH ULCER OF CALF WITH NECROSIS OF MUSCLE (HCC): Primary | ICD-10-CM

## 2023-12-07 DIAGNOSIS — L97.223 VARICOSE VEINS OF LEFT LOWER EXTREMITY WITH INFLAMMATION, WITH ULCER OF CALF WITH NECROSIS OF MUSCLE (HCC): Primary | ICD-10-CM

## 2023-12-07 PROCEDURE — 11042 DBRDMT SUBQ TIS 1ST 20SQCM/<: CPT

## 2023-12-07 RX ORDER — GENTAMICIN SULFATE 1 MG/G
OINTMENT TOPICAL ONCE
OUTPATIENT
Start: 2023-12-07 | End: 2023-12-07

## 2023-12-07 RX ORDER — LIDOCAINE HYDROCHLORIDE 20 MG/ML
JELLY TOPICAL ONCE
OUTPATIENT
Start: 2023-12-07 | End: 2023-12-07

## 2023-12-07 RX ORDER — CLOBETASOL PROPIONATE 0.5 MG/G
OINTMENT TOPICAL ONCE
OUTPATIENT
Start: 2023-12-07 | End: 2023-12-07

## 2023-12-07 RX ORDER — TRIAMCINOLONE ACETONIDE 1 MG/G
OINTMENT TOPICAL ONCE
OUTPATIENT
Start: 2023-12-07 | End: 2023-12-07

## 2023-12-07 RX ORDER — LIDOCAINE 50 MG/G
OINTMENT TOPICAL ONCE
OUTPATIENT
Start: 2023-12-07 | End: 2023-12-07

## 2023-12-07 RX ORDER — IBUPROFEN 200 MG
TABLET ORAL ONCE
OUTPATIENT
Start: 2023-12-07 | End: 2023-12-07

## 2023-12-07 RX ORDER — BACITRACIN ZINC AND POLYMYXIN B SULFATE 500; 1000 [USP'U]/G; [USP'U]/G
OINTMENT TOPICAL ONCE
OUTPATIENT
Start: 2023-12-07 | End: 2023-12-07

## 2023-12-07 RX ORDER — BETAMETHASONE DIPROPIONATE 0.5 MG/G
CREAM TOPICAL ONCE
OUTPATIENT
Start: 2023-12-07 | End: 2023-12-07

## 2023-12-07 RX ORDER — LIDOCAINE HYDROCHLORIDE 40 MG/ML
SOLUTION TOPICAL ONCE
OUTPATIENT
Start: 2023-12-07 | End: 2023-12-07

## 2023-12-07 RX ORDER — LIDOCAINE 40 MG/G
CREAM TOPICAL ONCE
OUTPATIENT
Start: 2023-12-07 | End: 2023-12-07

## 2023-12-07 RX ORDER — SODIUM CHLOR/HYPOCHLOROUS ACID 0.033 %
SOLUTION, IRRIGATION IRRIGATION ONCE
OUTPATIENT
Start: 2023-12-07 | End: 2023-12-07

## 2023-12-07 RX ORDER — GINSENG 100 MG
CAPSULE ORAL ONCE
OUTPATIENT
Start: 2023-12-07 | End: 2023-12-07

## 2023-12-07 NOTE — FLOWSHEET NOTE
12/07/23 0906   Left Leg Edema Point of Measurement   Compression Therapy Unna boot   Wound 11/16/23 Leg Left; Anterior #2   Date First Assessed/Time First Assessed: 11/16/23 1320   Present on Original Admission: No  Location: Leg  Wound Location Orientation: Left; Anterior  Wound Description (Comments): #2   Dressing/Treatment Collagen with Ag;Hydrofera blue;Gauze dressing/dressing sponge     Discharge Condition: Stable    Pain: 0    Ambulatory Status: None    Discharge Destination: home    Transportation:car    Accompanied by: SELF    Discharge instructions reviewed with SELF and copy or written instructions have been provided. All questions/concerns have been addressed at this time. [place] Application   Below Knee    NAME:  William Brady  YOB: 1990  MEDICAL RECORD NUMBER:  [id number]  DATE:  12/7/2023    Sherly mendoza: Applied moisturizing agent to dry skin as needed. Appied primary and secondary dressing as ordered. Applied Unna roll from toes to knee overlapping each time. Applied ace wrap or coban from toes to below the knee. Secured with tape and/or metal clips covered with tape. Instructed patient/caregiver to keep dressing dry and intact. DO NOT REMOVE DRESSING. Instructed pt/family/caregiver to report excessive draining, loose bandage, wet dressing, severe pain or tingling in toes. Applied [place] dressing below the knee to left lower leg. Unna Boot(s) were applied per  Guidelines.      Electronically signed by Irena Cole RN on 12/7/2023 at 9:07 AM

## 2023-12-07 NOTE — PROGRESS NOTES
Wound Center  Progress Note / Procedure Note      Chief Complaint:  Hortencia Mcginnis is a 29 y.o.  female  with L lower leg anterior wound of >1 year duration. Assessment/Plan     29 y.o. female with psoriatic arthritis on Humera inj for last 8-9 years    -L lower leg anterior chronic ulcer. Full thickness  Smaller, filling in  Slough/granular/  Necessitates debridement  for wound healing and to prevent/heal infection  Ulcer needs debridement- see note below      -venous insuff/leg swelling  Compression  Elevation  As wound on shin adv to stay off leg/foot as much aspossible to speed up healing    -?pyoderma gangrenosum  This diagnosis has been suggested to her  Doubtful, wound is getting smaller even with debridements    -nicotine dependence  Not interested in cessation yet        Following discussed with patient   Needs :  Serial debridement- prn    Good local wound care  Dressing:mupirocin, collagen, HBR    Frequency :once a week      -Edema management  Remove dressing prior to showering  Do not get dressing wet    Edema management:  Elevate leg(s) throughout the day starting in the morning  Compression:  unnas  Avoid prolonged standing    Nutrition :   Increase protein       Patient/  understood and agrees with plan. Questions answered. Follow up with me in 1 week    Subjective:     Since last visit  No new issues  Tolerated weekly compression    HPI:     Long complicated history  Wound came about 1 year ago as small lesion along with 2 other lesions- othse other two healed on own.   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

## 2023-12-07 NOTE — PATIENT INSTRUCTIONS
Discharge Instructions for  [address] [place]  Telephone: [phone]    [address] [place] [address] Information: Should you experience any significant changes in your wound(s) or have questions about your wound care, please contact the [address] [place] [address] at 1 [facility] 8:00 am - 4:30. If you need help with your wound outside these hours and cannot wait until we are again available, contact your PCP or go to the hospital emergency room. NAME:  Maria Esther Guzman  YOB: 1990  DATE:  12/7/2023    : Phillip Cerrato     [x] Wound and dressing supply provider: [facility]     Wound Cleansing:   Do not scrub or use excessive force. Cleanse wound prior to applying a clean dressing with:  [] Normal Saline   [x] Keep Wound Dry in Shower - may purchase a cast cover at local pharmacy     [] Cleanse wound with Mild Soap & Water    [] May Shower: remove dressing 1st, wash with mild soap and water, pat dry and redress wound right after with a new dressing  [] Do not shower  [] cleanse with baby shampoo lather leave 2-3 then rinse with water    Topical Treatments:  Do not apply lotions, creams, or ointments to wound bed unless directed. [x] Apply moisturizing lotion such as A&D ointment to skin surrounding the wound prior to dressing change.   [] Other:     Dressings:       Wound Location Left Anterior Lower Leg     Apply Primary Dressing:      [x] Mupirocin ointment to wound base 1st, Venessa collagen 2nd, then cut hydrofera blue ready smaller than wound base 3rd    Cover and Secure with:  [x] Multiple layers of Gauze [] ABD [] exudry     [] Dilan [] Kerlix [] Mepilex Border  [] Ace Wrap [x] Roll Tape   [x] Other: unna boot     Change dressing:   [] Daily      [] Every Other Day   [x] Three times per week  [] Once a week   [] Do Not Change Dressing     [] Other:     Edema Control: Every morning immediately when getting up

## 2023-12-14 ENCOUNTER — HOSPITAL ENCOUNTER (OUTPATIENT)
Facility: HOSPITAL | Age: 33
Discharge: HOME | End: 2023-12-14
Attending: FAMILY MEDICINE
Payer: MEDICAID

## 2023-12-14 VITALS
RESPIRATION RATE: 18 BRPM | DIASTOLIC BLOOD PRESSURE: 96 MMHG | HEART RATE: 93 BPM | SYSTOLIC BLOOD PRESSURE: 145 MMHG | TEMPERATURE: 97.3 F

## 2023-12-14 DIAGNOSIS — I83.222 VARICOSE VEINS OF LEFT LOWER EXTREMITY WITH INFLAMMATION, WITH ULCER OF CALF WITH NECROSIS OF MUSCLE (HCC): Primary | ICD-10-CM

## 2023-12-14 DIAGNOSIS — L97.223 VARICOSE VEINS OF LEFT LOWER EXTREMITY WITH INFLAMMATION, WITH ULCER OF CALF WITH NECROSIS OF MUSCLE (HCC): Primary | ICD-10-CM

## 2023-12-14 PROCEDURE — 11042 DBRDMT SUBQ TIS 1ST 20SQCM/<: CPT

## 2023-12-14 RX ORDER — SODIUM CHLOR/HYPOCHLOROUS ACID 0.033 %
SOLUTION, IRRIGATION IRRIGATION ONCE
OUTPATIENT
Start: 2023-12-14 | End: 2023-12-14

## 2023-12-14 RX ORDER — LIDOCAINE HYDROCHLORIDE 20 MG/ML
JELLY TOPICAL ONCE
OUTPATIENT
Start: 2023-12-14 | End: 2023-12-14

## 2023-12-14 RX ORDER — GINSENG 100 MG
CAPSULE ORAL ONCE
OUTPATIENT
Start: 2023-12-14 | End: 2023-12-14

## 2023-12-14 RX ORDER — CLOBETASOL PROPIONATE 0.5 MG/G
OINTMENT TOPICAL ONCE
OUTPATIENT
Start: 2023-12-14 | End: 2023-12-14

## 2023-12-14 RX ORDER — LIDOCAINE 50 MG/G
OINTMENT TOPICAL ONCE
OUTPATIENT
Start: 2023-12-14 | End: 2023-12-14

## 2023-12-14 RX ORDER — TRIAMCINOLONE ACETONIDE 1 MG/G
OINTMENT TOPICAL ONCE
OUTPATIENT
Start: 2023-12-14 | End: 2023-12-14

## 2023-12-14 RX ORDER — BETAMETHASONE DIPROPIONATE 0.5 MG/G
CREAM TOPICAL ONCE
OUTPATIENT
Start: 2023-12-14 | End: 2023-12-14

## 2023-12-14 RX ORDER — GENTAMICIN SULFATE 1 MG/G
OINTMENT TOPICAL ONCE
OUTPATIENT
Start: 2023-12-14 | End: 2023-12-14

## 2023-12-14 RX ORDER — IBUPROFEN 200 MG
TABLET ORAL ONCE
OUTPATIENT
Start: 2023-12-14 | End: 2023-12-14

## 2023-12-14 RX ORDER — LIDOCAINE HYDROCHLORIDE 40 MG/ML
SOLUTION TOPICAL ONCE
OUTPATIENT
Start: 2023-12-14 | End: 2023-12-14

## 2023-12-14 RX ORDER — LIDOCAINE 40 MG/G
CREAM TOPICAL ONCE
OUTPATIENT
Start: 2023-12-14 | End: 2023-12-14

## 2023-12-14 RX ORDER — BACITRACIN ZINC AND POLYMYXIN B SULFATE 500; 1000 [USP'U]/G; [USP'U]/G
OINTMENT TOPICAL ONCE
OUTPATIENT
Start: 2023-12-14 | End: 2023-12-14

## 2023-12-14 NOTE — PROGRESS NOTES
Wound Center  Progress Note / Procedure Note      Chief Complaint:  Gali Mitchell is a 29 y.o.  female  with L lower leg anterior wound of >1 year duration. Assessment/Plan     29 y.o. female with psoriatic arthritis on Humera inj for last 8-9 years    -L lower leg anterior chronic ulcer. Full thickness  Smaller, filling in  Slough/granular/  Necessitates debridement  for wound healing and to prevent/heal infection  Ulcer needs debridement- see note below      -venous insuff/leg swelling  Compression  Elevation  As wound on shin adv to stay off leg/foot as much aspossible to speed up healing      -nicotine dependence  Not interested in cessation yet      Following discussed with patient   Needs :  Serial debridement- prn    Good local wound care  Dressing:mupirocin, collagen, HBR    Frequency :once a week      -Edema management  Remove dressing prior to showering  Do not get dressing wet    Edema management:  Elevate leg(s) throughout the day starting in the morning  Compression:  unnas  Avoid prolonged standing    Nutrition :   Increase protein       Patient/  understood and agrees with plan. Questions answered. Follow up with me in 1 week    Subjective:     Since last visit  No new issues      HPI:     Long complicated history  Wound came about 1 year ago as small lesion along with 2 other lesions- othse other two healed on own.   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

## 2023-12-14 NOTE — PATIENT INSTRUCTIONS
should be applied to affected leg(s) from mid foot to knee making sure to cover the heel. Remove every night before going to bed if desired. Apply: [] Compression Stocking      []Right Leg           []Left Leg   [] Tubigrip                             [] Right Leg Single Layer  [] Right Leg Double Layer                             [] Left Leg Single Layer [] Left Leg Double Layer      Compression: Do not get leg(s) with wrap wet. If wraps become too tight call the center or completely remove the wrap. Apply: [] Three Layer Compression Wrap  []RightLeg []Left Leg  [] Four layer Compression Wrap      []RightLeg []Left Leg   [x]  Unna's boot                                  [] Right Leg   [x] Left Leg       [x] Elevate leg(s) when sitting. [x] Avoid prolonged standing in one place. Dietary:  [x] Diet as tolerated   [x] Increase Protein: examples (Meat, cheese, eggs, greek yogurt, fish, nuts)   [] Renny Therapeutic Nutrition Powder  [] Dial a Dietician : Call [employer] at [phone] enter code ([phone]) when prompted. M-F 9am-5pm EST. Return Appointment:  [] Nurse Visit at wound center in  days   [x] Return Appointment: With Dr. Jae Muniz  in 1 week(s)  [] Ordered tests:     Electronically signed on 12/14/2023 at 7:57 AM     PLEASE NOTE: IF YOU ARE UNABLE  [facility], CONTINUE TO USE THE SUPPLIES YOU HAVE AVAILABLE UNTIL YOU ARE ABLE  [facility]. IT IS MOST IMPORTANT TO KEEP THE WOUND COVERED AT ALL TIMES.      Physician Signature:_______________________  Dr. Jae Muniz

## 2023-12-14 NOTE — FLOWSHEET NOTE
12/14/23 0849   Left Leg Edema Point of Measurement   Compression Therapy Unna boot   Wound 11/16/23 Leg Left; Anterior #2   Date First Assessed/Time First Assessed: 11/16/23 1320   Present on Original Admission: No  Location: Leg  Wound Location Orientation: Left; Anterior  Wound Description (Comments): #2   Dressing/Treatment Collagen with Ag;Hydrofera blue;Gauze dressing/dressing sponge  (Mupirocin)     Discharge Condition: Stable    Pain: 0    Ambulatory Status: None    Discharge Destination: home    Transportation:car    Accompanied by: SELF    Discharge instructions reviewed with SELF and copy or written instructions have been provided. All questions/concerns have been addressed at this time. [place] Application   Below Knee    NAME:  Sun Celestin  YOB: 1990  MEDICAL RECORD NUMBER:  [id number]  DATE:  12/14/2023    Madelyn Graff boot: Applied moisturizing agent to dry skin as needed. Appied primary and secondary dressing as ordered. Applied Unna roll from toes to knee overlapping each time. Applied ace wrap or coban from toes to below the knee. Secured with tape and/or metal clips covered with tape. Instructed patient/caregiver to keep dressing dry and intact. DO NOT REMOVE DRESSING. Instructed pt/family/caregiver to report excessive draining, loose bandage, wet dressing, severe pain or tingling in toes. Applied [place] dressing below the knee to left lower leg. Unna Boot(s) were applied per  Guidelines.      Electronically signed by Brown Truong RN on 12/14/2023 at 8:51 AM

## 2023-12-28 ENCOUNTER — HOSPITAL ENCOUNTER (OUTPATIENT)
Facility: HOSPITAL | Age: 33
Discharge: HOME | End: 2023-12-28
Attending: FAMILY MEDICINE
Payer: MEDICAID

## 2023-12-28 VITALS
TEMPERATURE: 97.7 F | DIASTOLIC BLOOD PRESSURE: 94 MMHG | SYSTOLIC BLOOD PRESSURE: 134 MMHG | HEART RATE: 90 BPM | RESPIRATION RATE: 18 BRPM

## 2023-12-28 DIAGNOSIS — L97.922 NON-PRESSURE ULCER OF LEFT LOWER EXTREMITY WITH FAT LAYER EXPOSED (HCC): ICD-10-CM

## 2023-12-28 DIAGNOSIS — L97.223 VARICOSE VEINS OF LEFT LOWER EXTREMITY WITH INFLAMMATION, WITH ULCER OF CALF WITH NECROSIS OF MUSCLE (HCC): Primary | ICD-10-CM

## 2023-12-28 DIAGNOSIS — I83.222 VARICOSE VEINS OF LEFT LOWER EXTREMITY WITH INFLAMMATION, WITH ULCER OF CALF WITH NECROSIS OF MUSCLE (HCC): Primary | ICD-10-CM

## 2023-12-28 PROCEDURE — 11042 DBRDMT SUBQ TIS 1ST 20SQCM/<: CPT

## 2023-12-28 RX ORDER — SODIUM CHLOR/HYPOCHLOROUS ACID 0.033 %
SOLUTION, IRRIGATION IRRIGATION ONCE
OUTPATIENT
Start: 2023-12-28 | End: 2023-12-28

## 2023-12-28 RX ORDER — CLOBETASOL PROPIONATE 0.5 MG/G
OINTMENT TOPICAL ONCE
OUTPATIENT
Start: 2023-12-28 | End: 2023-12-28

## 2023-12-28 RX ORDER — LIDOCAINE 40 MG/G
CREAM TOPICAL ONCE
OUTPATIENT
Start: 2023-12-28 | End: 2023-12-28

## 2023-12-28 RX ORDER — BETAMETHASONE DIPROPIONATE 0.5 MG/G
CREAM TOPICAL ONCE
OUTPATIENT
Start: 2023-12-28 | End: 2023-12-28

## 2023-12-28 RX ORDER — LIDOCAINE HYDROCHLORIDE 40 MG/ML
SOLUTION TOPICAL ONCE
OUTPATIENT
Start: 2023-12-28 | End: 2023-12-28

## 2023-12-28 RX ORDER — LIDOCAINE HYDROCHLORIDE 20 MG/ML
JELLY TOPICAL ONCE
OUTPATIENT
Start: 2023-12-28 | End: 2023-12-28

## 2023-12-28 RX ORDER — TRIAMCINOLONE ACETONIDE 1 MG/G
OINTMENT TOPICAL ONCE
OUTPATIENT
Start: 2023-12-28 | End: 2023-12-28

## 2023-12-28 RX ORDER — IBUPROFEN 200 MG
TABLET ORAL ONCE
OUTPATIENT
Start: 2023-12-28 | End: 2023-12-28

## 2023-12-28 RX ORDER — GENTAMICIN SULFATE 1 MG/G
OINTMENT TOPICAL ONCE
OUTPATIENT
Start: 2023-12-28 | End: 2023-12-28

## 2023-12-28 RX ORDER — LIDOCAINE 50 MG/G
OINTMENT TOPICAL ONCE
OUTPATIENT
Start: 2023-12-28 | End: 2023-12-28

## 2023-12-28 RX ORDER — BACITRACIN ZINC AND POLYMYXIN B SULFATE 500; 1000 [USP'U]/G; [USP'U]/G
OINTMENT TOPICAL ONCE
OUTPATIENT
Start: 2023-12-28 | End: 2023-12-28

## 2023-12-28 RX ORDER — GINSENG 100 MG
CAPSULE ORAL ONCE
OUTPATIENT
Start: 2023-12-28 | End: 2023-12-28

## 2023-12-28 NOTE — FLOWSHEET NOTE
12/28/23 1149   Left Leg Edema Point of Measurement   Compression Therapy Unna boot   Wound 11/16/23 Leg Left; Anterior #2   Date First Assessed/Time First Assessed: 11/16/23 1320   Present on Original Admission: No  Location: Leg  Wound Location Orientation: Left; Anterior  Wound Description (Comments): #2   Dressing/Treatment Collagen with Ag;Hydrofera blue;Gauze dressing/dressing sponge   Post-Procedure Length (cm) 1 cm   Post-Procedure Width (cm) 1.2 cm   Post-Procedure Depth (cm) 0.3 cm   Post-Procedure Surface Area (cm^2) 1.2 cm^2   Post-Procedure Volume (cm^3) 0.36 cm^3     Discharge Condition: Stable    Pain: 0    Ambulatory Status: none    Discharge Destination: home    Transportation:car    Accompanied by: SELF    Discharge instructions reviewed with SELF and copy or written instructions have been provided. All questions/concerns have been addressed at this time. [employer] Application   Below Knee    NAME:  Lyudmila Mendenhall  YOB: 1990  MEDICAL RECORD NUMBER:  [id number]  DATE:  12/28/2023    Nathan Lucas boot: Applied moisturizing agent to dry skin as needed. Appied primary and secondary dressing as ordered. Applied Unna roll from toes to knee overlapping each time. Applied ace wrap or coban from toes to below the knee. Secured with tape and/or metal clips covered with tape. Instructed patient/caregiver to keep dressing dry and intact. DO NOT REMOVE DRESSING. Instructed pt/family/caregiver to report excessive draining, loose bandage, wet dressing, severe pain or tingling in toes. Applied [place] dressing below the knee to left lower leg. Unna Boot(s) were applied per  Guidelines.      Electronically signed by Bret Goyal RN on 12/28/2023 at 12:01 PM

## 2023-12-28 NOTE — PATIENT INSTRUCTIONS
from mid foot to knee making sure to cover the heel. Remove every night before going to bed if desired. Apply: [] Compression Stocking      []Right Leg           []Left Leg   [] Tubigrip                             [] Right Leg Single Layer  [] Right Leg Double Layer                             [] Left Leg Single Layer [] Left Leg Double Layer      Compression: Do not get leg(s) with wrap wet. If wraps become too tight call the center or completely remove the wrap. Apply: [] Three Layer Compression Wrap  []RightLeg []Left Leg  [] Four layer Compression Wrap      []RightLeg []Left Leg   [x]  Unna's boot                                  [] Right Leg   [x] Left Leg       [x] Elevate leg(s) when sitting. [x] Avoid prolonged standing in one place. Dietary:  [x] Diet as tolerated   [x] Increase Protein: examples (Meat, cheese, eggs, greek yogurt, fish, nuts)   [] Renny Therapeutic Nutrition Powder  [] Dial a Dietician : Call [employer] at [phone] enter code ([phone]) when prompted. M-F 9am-5pm EST. Return Appointment:  [] Nurse Visit at wound center in  days   [x] Return Appointment: With Dr. Amrit Cevallos in 1 week(s) Thursday  [] Ordered tests:     Electronically signed on 12/28/2023 at 9:51 AM     PLEASE NOTE: IF YOU ARE UNABLE  [facility], CONTINUE TO USE THE SUPPLIES YOU HAVE AVAILABLE UNTIL YOU ARE ABLE  [facility]. IT IS MOST IMPORTANT TO KEEP THE WOUND COVERED AT ALL TIMES.      Physician Signature:_______________________  Dr. Kelsy Reyes

## 2024-01-04 ENCOUNTER — HOSPITAL ENCOUNTER (OUTPATIENT)
Facility: HOSPITAL | Age: 34
Discharge: HOME | End: 2024-01-04
Attending: FAMILY MEDICINE
Payer: MEDICAID

## 2024-01-04 VITALS
DIASTOLIC BLOOD PRESSURE: 89 MMHG | RESPIRATION RATE: 16 BRPM | SYSTOLIC BLOOD PRESSURE: 121 MMHG | HEART RATE: 111 BPM | TEMPERATURE: 98.2 F

## 2024-01-04 DIAGNOSIS — L97.223 VARICOSE VEINS OF LEFT LOWER EXTREMITY WITH INFLAMMATION, WITH ULCER OF CALF WITH NECROSIS OF MUSCLE (HCC): Primary | ICD-10-CM

## 2024-01-04 DIAGNOSIS — I83.222 VARICOSE VEINS OF LEFT LOWER EXTREMITY WITH INFLAMMATION, WITH ULCER OF CALF WITH NECROSIS OF MUSCLE (HCC): Primary | ICD-10-CM

## 2024-01-04 PROCEDURE — 11042 DBRDMT SUBQ TIS 1ST 20SQCM/<: CPT

## 2024-01-04 RX ORDER — CLOBETASOL PROPIONATE 0.5 MG/G
OINTMENT TOPICAL ONCE
OUTPATIENT
Start: 2024-01-04 | End: 2024-01-04

## 2024-01-04 RX ORDER — BETAMETHASONE DIPROPIONATE 0.5 MG/G
CREAM TOPICAL ONCE
OUTPATIENT
Start: 2024-01-04 | End: 2024-01-04

## 2024-01-04 RX ORDER — LIDOCAINE HYDROCHLORIDE 40 MG/ML
SOLUTION TOPICAL ONCE
OUTPATIENT
Start: 2024-01-04 | End: 2024-01-04

## 2024-01-04 RX ORDER — SODIUM CHLOR/HYPOCHLOROUS ACID 0.033 %
SOLUTION, IRRIGATION IRRIGATION ONCE
OUTPATIENT
Start: 2024-01-04 | End: 2024-01-04

## 2024-01-04 RX ORDER — IBUPROFEN 200 MG
TABLET ORAL ONCE
OUTPATIENT
Start: 2024-01-04 | End: 2024-01-04

## 2024-01-04 RX ORDER — LIDOCAINE 40 MG/G
CREAM TOPICAL ONCE
OUTPATIENT
Start: 2024-01-04 | End: 2024-01-04

## 2024-01-04 RX ORDER — LIDOCAINE HYDROCHLORIDE 20 MG/ML
JELLY TOPICAL ONCE
OUTPATIENT
Start: 2024-01-04 | End: 2024-01-04

## 2024-01-04 RX ORDER — GENTAMICIN SULFATE 1 MG/G
OINTMENT TOPICAL ONCE
OUTPATIENT
Start: 2024-01-04 | End: 2024-01-04

## 2024-01-04 RX ORDER — LIDOCAINE 50 MG/G
OINTMENT TOPICAL ONCE
OUTPATIENT
Start: 2024-01-04 | End: 2024-01-04

## 2024-01-04 RX ORDER — GINSENG 100 MG
CAPSULE ORAL ONCE
OUTPATIENT
Start: 2024-01-04 | End: 2024-01-04

## 2024-01-04 RX ORDER — BACITRACIN ZINC AND POLYMYXIN B SULFATE 500; 1000 [USP'U]/G; [USP'U]/G
OINTMENT TOPICAL ONCE
OUTPATIENT
Start: 2024-01-04 | End: 2024-01-04

## 2024-01-04 RX ORDER — TRIAMCINOLONE ACETONIDE 1 MG/G
OINTMENT TOPICAL ONCE
OUTPATIENT
Start: 2024-01-04 | End: 2024-01-04

## 2024-01-04 NOTE — FLOWSHEET NOTE
Unna Boot Application   Below Knee    NAME:  Ana Owusu  YOB: 1990  MEDICAL RECORD NUMBER:  [id number]  DATE:  1/4/2024    Unna boot: Applied moisturizing agent to dry skin as needed.   Appied primary and secondary dressing as ordered.  Applied Unna roll from toes to knee overlapping each time.   Applied ace wrap or coban from toes to below the knee.   Secured with tape and/or metal clips covered with tape.   Instructed patient/caregiver to keep dressing dry and intact. DO NOT REMOVE DRESSING.   Instructed pt/family/caregiver to report excessive draining, loose bandage, wet dressing, severe pain or tingling in toes.  Applied Unna Boot dressing below the knee to right lower leg.    Unna Boot(s) were applied per  Guidelines.     Electronically signed by Nicola Mendoza RN on 1/4/2024 at 9:29 AM     01/04/24 0842   Left Leg Edema Point of Measurement   Compression Therapy Unna boot   Wound 11/16/23 Leg Left;Anterior #2   Date First Assessed/Time First Assessed: 11/16/23 1320   Present on Original Admission: No  Location: Leg  Wound Location Orientation: Left;Anterior  Wound Description (Comments): #2   Dressing/Treatment Collagen with Ag;Foam;Gauze dressing/dressing sponge  (unna's boot)     /89   Pulse (!) 111   Temp 98.2 °F (36.8 °C) (Temporal)   Resp 16

## 2024-01-04 NOTE — FLOWSHEET NOTE
01/04/24 0816   Anesthetic   Anesthetic 4% Lidocaine Liquid Topical   Left Leg Edema Point of Measurement   Leg circumference 47.3 cm   Ankle circumference 25.2 cm   LLE Neurovascular Assessment   Capillary Refill Less than/Equal to 3 seconds   Color Appropriate for Ethnicity   Temperature Cool   L Pedal Pulse +2   Wound 11/16/23 Leg Left;Anterior #2   Date First Assessed/Time First Assessed: 11/16/23 1320   Present on Original Admission: No  Location: Leg  Wound Location Orientation: Left;Anterior  Wound Description (Comments): #2   Wound Image    Dressing Status Old drainage noted   Wound Cleansed Cleansed with saline   Wound Length (cm) 0.7 cm   Wound Width (cm) 0.7 cm   Wound Depth (cm) 0.1 cm   Wound Surface Area (cm^2) 0.49 cm^2   Change in Wound Size % (l*w) 94.9   Wound Volume (cm^3) 0.049 cm^3   Wound Healing % 100   Wound Assessment Kingfisher/red;Slough   Drainage Amount Small (< 25%)   Drainage Description Serosanguinous   Odor None   Debbie-wound Assessment Dry/flaky   Margins Epibole (rolled edges)   Pain Assessment   Pain Assessment None - Denies Pain     /89   Pulse (!) 111   Temp 98.2 °F (36.8 °C) (Temporal)   Resp 16

## 2024-01-04 NOTE — PROGRESS NOTES
/  Instrument: curette  Residual Necrosis: Present and scored   Bleeding: <1ml   Hemostasis: Pressure   Patient tolerated procedure well   Procedural Pain: 0  Post - procedural pain: none    Post debridement measurements: see below  Surface area debrided: 0.64sq. cm        -------  Wound 11/16/23 Leg Left;Anterior #2 (Active)   Wound Image   01/04/24 0816   Wound Etiology Venous 01/04/24 0834   Dressing Status Old drainage noted 01/04/24 0816   Wound Cleansed Cleansed with saline 01/04/24 0816   Dressing/Treatment Collagen with Ag;Foam;Gauze dressing/dressing sponge 01/04/24 0842   Wound Length (cm) 0.7 cm 01/04/24 0816   Wound Width (cm) 0.7 cm 01/04/24 0816   Wound Depth (cm) 0.1 cm 01/04/24 0816   Wound Surface Area (cm^2) 0.49 cm^2 01/04/24 0816   Change in Wound Size % (l*w) 94.9 01/04/24 0816   Wound Volume (cm^3) 0.049 cm^3 01/04/24 0816   Wound Healing % 100 01/04/24 0816   Post-Procedure Length (cm) 0.8 cm 01/04/24 0834   Post-Procedure Width (cm) 0.8 cm 01/04/24 0834   Post-Procedure Depth (cm) 0.2 cm 01/04/24 0834   Post-Procedure Surface Area (cm^2) 0.64 cm^2 01/04/24 0834   Post-Procedure Volume (cm^3) 0.128 cm^3 01/04/24 0834   Wound Assessment Pink/red;Slough 01/04/24 0816   Drainage Amount Small (< 25%) 01/04/24 0816   Drainage Description Serosanguinous 01/04/24 0816   Odor None 01/04/24 0816   Debbie-wound Assessment Dry/flaky 01/04/24 0816   Margins Epibole (rolled edges) 01/04/24 0816   Wound Thickness Description not for Pressure Injury Full thickness 12/21/23 0823   Number of days: 48          -------    Past Medical History:   Diagnosis Date    Psoriasis      Past Surgical History:   Procedure Laterality Date    TONSILLECTOMY       No family history on file.  Social History     Socioeconomic History    Marital status: Single   Tobacco Use    Smoking status: Every Day     Current packs/day: 0.75     Average packs/day: 0.8 packs/day for 10.0 years (7.5 ttl pk-yrs)     Types: Cigarettes

## 2024-01-04 NOTE — PATIENT INSTRUCTIONS
Discharge Instructions for  [facility]  [address] [place] [address]  Telephone: [phone]     FAX [phone]    Wound Care Center Information: Should you experience any significant changes in your wound(s) or have questions about your wound care, please contact the [facility] at MONDAY - FRIDAY 8:00 am - 4:30.  If you need help with your wound outside these hours and cannot wait until we are again available, contact your PCP or go to the hospital emergency room.     NAME:  Ana Owusu  YOB: 1990  DATE:  1/4/2024    : Christine     [x] Wound and dressing supply provider: [facility]     Wound Cleansing:   Do not scrub or use excessive force.  Cleanse wound prior to applying a clean dressing with:  [] Normal Saline   [x] Keep Wound Dry in Shower - may purchase a cast cover at local pharmacy     [] Cleanse wound with Mild Soap & Water    [] May Shower: remove dressing 1st, wash with mild soap and water, pat dry and redress wound right after with a new dressing  [] Do not shower  [] cleanse with baby shampoo lather leave 2-3 then rinse with water    Topical Treatments:  Do not apply lotions, creams, or ointments to wound bed unless directed.   [x] Apply moisturizing lotion such as A&D ointment to skin surrounding the wound prior to dressing change.  [] Other:     Dressings:       Wound Location Left Anterior Lower Leg     Apply Primary Dressing:      [x] Venessa collagen 1st, then cut hydrofera blue ready smaller than wound base 2nd    Cover and Secure with:  [x] Multiple layers of Gauze [] ABD [] exudry     [] Dilan [] Kerlix [] Mepilex Border  [] Ace Wrap [x] Roll Tape   [x] Other: unna boot     Change dressing:   [] Daily      [] Every Other Day   [x] Three times per week  [] Once a week   [] Do Not Change Dressing     [] Other:     Edema Control: Every morning immediately when getting up should be applied to affected leg(s)

## 2024-01-22 ENCOUNTER — HOSPITAL ENCOUNTER (OUTPATIENT)
Facility: HOSPITAL | Age: 34
Discharge: HOME | End: 2024-01-22
Attending: FAMILY MEDICINE
Payer: MEDICAID

## 2024-01-22 VITALS
DIASTOLIC BLOOD PRESSURE: 98 MMHG | HEART RATE: 92 BPM | RESPIRATION RATE: 18 BRPM | SYSTOLIC BLOOD PRESSURE: 137 MMHG | TEMPERATURE: 98.1 F

## 2024-01-22 DIAGNOSIS — I83.222 VARICOSE VEINS OF LEFT LOWER EXTREMITY WITH INFLAMMATION, WITH ULCER OF CALF WITH NECROSIS OF MUSCLE (HCC): Primary | ICD-10-CM

## 2024-01-22 DIAGNOSIS — L97.223 VARICOSE VEINS OF LEFT LOWER EXTREMITY WITH INFLAMMATION, WITH ULCER OF CALF WITH NECROSIS OF MUSCLE (HCC): Primary | ICD-10-CM

## 2024-01-22 PROBLEM — L97.922 NON-PRESSURE ULCER OF LEFT LOWER EXTREMITY WITH FAT LAYER EXPOSED (HCC): Status: RESOLVED | Noted: 2023-12-28 | Resolved: 2024-01-22

## 2024-01-22 PROCEDURE — 99213 OFFICE O/P EST LOW 20 MIN: CPT | Performed by: SURGERY

## 2024-01-22 PROCEDURE — 99212 OFFICE O/P EST SF 10 MIN: CPT

## 2024-01-22 RX ORDER — BACITRACIN ZINC AND POLYMYXIN B SULFATE 500; 1000 [USP'U]/G; [USP'U]/G
OINTMENT TOPICAL ONCE
Status: CANCELLED | OUTPATIENT
Start: 2024-01-22 | End: 2024-01-22

## 2024-01-22 RX ORDER — LIDOCAINE HYDROCHLORIDE 40 MG/ML
SOLUTION TOPICAL ONCE
Status: CANCELLED | OUTPATIENT
Start: 2024-01-22 | End: 2024-01-22

## 2024-01-22 RX ORDER — CLOBETASOL PROPIONATE 0.5 MG/G
OINTMENT TOPICAL ONCE
Status: CANCELLED | OUTPATIENT
Start: 2024-01-22 | End: 2024-01-22

## 2024-01-22 RX ORDER — SODIUM CHLOR/HYPOCHLOROUS ACID 0.033 %
SOLUTION, IRRIGATION IRRIGATION ONCE
Status: CANCELLED | OUTPATIENT
Start: 2024-01-22 | End: 2024-01-22

## 2024-01-22 RX ORDER — BETAMETHASONE DIPROPIONATE 0.5 MG/G
CREAM TOPICAL ONCE
Status: CANCELLED | OUTPATIENT
Start: 2024-01-22 | End: 2024-01-22

## 2024-01-22 RX ORDER — LIDOCAINE 40 MG/G
CREAM TOPICAL ONCE
Status: CANCELLED | OUTPATIENT
Start: 2024-01-22 | End: 2024-01-22

## 2024-01-22 RX ORDER — LIDOCAINE 50 MG/G
OINTMENT TOPICAL ONCE
Status: CANCELLED | OUTPATIENT
Start: 2024-01-22 | End: 2024-01-22

## 2024-01-22 RX ORDER — GENTAMICIN SULFATE 1 MG/G
OINTMENT TOPICAL ONCE
Status: CANCELLED | OUTPATIENT
Start: 2024-01-22 | End: 2024-01-22

## 2024-01-22 RX ORDER — LIDOCAINE HYDROCHLORIDE 20 MG/ML
JELLY TOPICAL ONCE
Status: CANCELLED | OUTPATIENT
Start: 2024-01-22 | End: 2024-01-22

## 2024-01-22 RX ORDER — IBUPROFEN 200 MG
TABLET ORAL ONCE
Status: CANCELLED | OUTPATIENT
Start: 2024-01-22 | End: 2024-01-22

## 2024-01-22 RX ORDER — TRIAMCINOLONE ACETONIDE 1 MG/G
OINTMENT TOPICAL ONCE
Status: CANCELLED | OUTPATIENT
Start: 2024-01-22 | End: 2024-01-22

## 2024-01-22 RX ORDER — GINSENG 100 MG
CAPSULE ORAL ONCE
Status: CANCELLED | OUTPATIENT
Start: 2024-01-22 | End: 2024-01-22

## 2024-01-22 NOTE — FLOWSHEET NOTE
01/22/24 1323   Left Leg Edema Point of Measurement   Leg circumference 49.5 cm   Ankle circumference 25.5 cm   LLE Neurovascular Assessment   Capillary Refill Less than/Equal to 3 seconds   Color Appropriate for Ethnicity   Temperature Warm   L Pedal Pulse +2   Wound 11/16/23 Leg Left;Anterior #2   Date First Assessed/Time First Assessed: 11/16/23 1320   Present on Original Admission: No  Location: Leg  Wound Location Orientation: Left;Anterior  Wound Description (Comments): #2   Wound Image    Dressing Status Intact   Wound Cleansed Cleansed with saline   Wound Length (cm) 0 cm   Wound Width (cm) 0 cm   Wound Depth (cm) 0 cm   Wound Surface Area (cm^2) 0 cm^2   Change in Wound Size % (l*w) 100   Wound Volume (cm^3) 0 cm^3   Wound Healing % 100   Wound Assessment Epithelialization   Drainage Amount None (dry)   Odor None   Debbie-wound Assessment Dry/flaky   Margins Attached edges     BP (!) 137/98   Pulse 92   Temp 98.1 °F (36.7 °C) (Temporal)   Resp 18

## 2024-01-22 NOTE — FLOWSHEET NOTE
01/22/24 1345   Left Leg Edema Point of Measurement   Compression Therapy Tubular elastic support bandage   [REMOVED] Wound 11/16/23 Leg Left;Anterior #2   Final Assessment Date/Final Assessment Time: 01/22/24 1334  Date First Assessed/Time First Assessed: 11/16/23 1320   Present on Original Admission: No  Location: Leg  Wound Location Orientation: Left;Anterior  Wound Description (Comments): #2  Wound O...   Dressing/Treatment Moisturizing cream     Discharge Condition: Stable    Pain: 0    Ambulatory Status:Walking    Discharge Destination: Home    Transportation:Car    Accompanied by: Self    Discharge instructions reviewed with Self and copy or written instructions have been provided. All questions/concerns have been addressed at this time.

## 2024-01-22 NOTE — PATIENT INSTRUCTIONS
Discharge Instructions for  [facility]  [address] [place] [address]  Telephone: [phone]     FAX [phone]    Wound Care Center Information: Should you experience any significant changes in your wound(s) or have questions about your wound care, please contact the [facility] at MONDAY - FRIDAY 8:00 am - 4:30.  If you need help with your wound outside these hours and cannot wait until we are again available, contact your PCP or go to the hospital emergency room.     NAME:  Ana Owusu  YOB: 1990  DATE:  1/22/2024    : Christine     Wound Cleansing:   Do not scrub or use excessive force.  Cleanse wound prior to applying a clean dressing with:  [] Normal Saline   [] Keep Wound Dry in Shower - may purchase a cast cover at local pharmacy     [] Cleanse wound with Mild Soap & Water    [x] May Shower  [] Do not shower  [] cleanse with baby shampoo lather leave 2-3 then rinse with water    Topical Treatments:  Do not apply lotions, creams, or ointments to wound bed unless directed.   [x] Apply moisturizing lotion such as A&D ointment to skin surrounding the wound prior to dressing change.     Edema Control: Every morning immediately when getting up should be applied to affected leg(s) from mid foot to knee making sure to cover the heel.  Remove every night before going to bed if desired.  Apply: [x] Compression Stocking      []Right Leg           [x]Left Leg-order compression socks 15-20mmhg   [x] Tubigrip                             [] Right Leg Single Layer  [] Right Leg Double Layer                             [] Left Leg Single Layer [x] Left Leg Double Layer         [x] Elevate leg(s) when sitting.   [x] Avoid prolonged standing in one place.    Dietary:  [x] Diet as tolerated   [x] Increase Protein: examples (Meat, cheese, eggs, greek yogurt, fish, nuts)       [facility] THANK YOU    Your treatment has been completed at [facility]

## 2024-01-22 NOTE — PROGRESS NOTES
Wound care    The patient is a 33-year-old woman who has been followed at the wound care center by  regarding ulceration on the left lower leg.  She has been treated here with dressings with Unna boots.    The patient reports a tendency for mild swelling in the left lower leg.    The patient has history of psoriatic arthritis.  She takes Humira injections.      Physical examination    Vital signs blood pressure 137/98 pulse 92 temperature 98.1 respirations 18    Examination of the left lower extremity reveals palpable dorsalis pedis pulse.  The wound on the anterior lower leg shows full epithelial coverage.        Left lower leg wound is healed.    I have recommended the patient wear 15 to 20 mmHg compression stocking on left lower leg at all times and she is out of bed.  Rationale was reviewed.    The patient should inspect her leg daily to confirm that there is been no recurrence of ulceration.    No follow-up appointment at the wound care center is needed.        Diagnosis: Nonpressure ulcer left lower leg with fat layer exposed, leg edema.      Mack Beck MD